# Patient Record
Sex: MALE | Race: WHITE | HISPANIC OR LATINO | URBAN - METROPOLITAN AREA
[De-identification: names, ages, dates, MRNs, and addresses within clinical notes are randomized per-mention and may not be internally consistent; named-entity substitution may affect disease eponyms.]

---

## 2019-02-09 ENCOUNTER — HOSPITAL ENCOUNTER (EMERGENCY)
Facility: OTHER | Age: 39
Discharge: HOME | End: 2019-02-09
Attending: EMERGENCY MEDICINE
Payer: COMMERCIAL

## 2019-02-09 VITALS
WEIGHT: 162 LBS | DIASTOLIC BLOOD PRESSURE: 58 MMHG | OXYGEN SATURATION: 99 % | RESPIRATION RATE: 18 BRPM | HEART RATE: 91 BPM | SYSTOLIC BLOOD PRESSURE: 115 MMHG | TEMPERATURE: 98 F

## 2019-02-09 DIAGNOSIS — J06.9 UPPER RESPIRATORY TRACT INFECTION, UNSPECIFIED TYPE: Primary | ICD-10-CM

## 2019-02-09 DIAGNOSIS — R09.81 NASAL CONGESTION: ICD-10-CM

## 2019-02-09 PROCEDURE — 99283 EMERGENCY DEPT VISIT LOW MDM: CPT

## 2019-02-09 PROCEDURE — 25000003 PHARM REV CODE 250: Performed by: EMERGENCY MEDICINE

## 2019-02-09 RX ADMIN — Medication 1 SPRAY: at 01:02

## 2019-02-09 NOTE — ED PROVIDER NOTES
Encounter Date: 2/9/2019    SCRIBE #1 NOTE: I, Alexx Mullins, am scribing for, and in the presence of, Dr. Davis .       History     Chief Complaint   Patient presents with    sinus congestion     sinus drainage with foul smell x 5 days. Cough somewhat relieved from tessalon pearls. Tested negative for flu twice over 2 week period.      Time seen by provider: 1:29 PM    This is a 38 y.o. male who presents with complaint of left-sided nasal congestion for the last week. He reports nasal discharge and foul smells. He has complied with fluticasone BID with no relief. Patient also reports intermittent fevers, intermittent chills, and dry cough the past five days. He has complied with Tessalon perles with improvement of cough. Patient notes he tested negative for the flu last week. He denies headaches, dizziness, sore throat, SOB, chest pain, abdominal pain, N/V/D, dysuria, urinary frequency, or urinary urgency. He denies significant past medical history, past surgical history, or use of daily prescription medications. He admits intermittent use of alcohol, but denies use of alcohol or illicit drugs.       The history is provided by the patient.     Review of patient's allergies indicates:  No Known Allergies  History reviewed. No pertinent past medical history.  History reviewed. No pertinent surgical history.  History reviewed. No pertinent family history.  Social History     Tobacco Use    Smoking status: Current Some Day Smoker   Substance Use Topics    Alcohol use: Yes    Drug use: Not on file     Review of Systems   Constitutional: Positive for chills and fever.   HENT: Positive for congestion (left naris) and rhinorrhea. Negative for sore throat.    Respiratory: Positive for cough. Negative for shortness of breath.    Cardiovascular: Negative for chest pain.   Gastrointestinal: Negative for abdominal pain, diarrhea, nausea and vomiting.   Genitourinary: Negative for dysuria, frequency and urgency.    Musculoskeletal: Negative for back pain.   Skin: Negative for rash.   Neurological: Negative for dizziness and headaches.   Psychiatric/Behavioral: Negative for confusion.       Physical Exam     Initial Vitals [02/09/19 1223]   BP Pulse Resp Temp SpO2   117/68 82 16 98.4 °F (36.9 °C) 100 %      MAP       --         Physical Exam    Nursing note and vitals reviewed.  Constitutional: He appears well-developed and well-nourished. No distress.   HENT:   Head: Normocephalic and atraumatic.   Swollen turbinates. Post nasal drip present. Cobblestoning of posterior oropharynx.    Eyes: Conjunctivae and EOM are normal. Pupils are equal, round, and reactive to light.   Neck: Normal range of motion. Neck supple.   Shotty anterior cervical lymphadenopathy.    Cardiovascular: Normal rate, regular rhythm, normal heart sounds and intact distal pulses.   Pulmonary/Chest: Breath sounds normal. No respiratory distress. He has no wheezes. He has no rhonchi. He has no rales.   Abdominal: Soft. There is no tenderness.   Musculoskeletal: Normal range of motion. He exhibits no edema.   Lymphadenopathy:     He has cervical adenopathy.   Neurological: He is alert and oriented to person, place, and time. He has normal strength. No cranial nerve deficit or sensory deficit.   Skin: Skin is warm and dry.   Psychiatric: He has a normal mood and affect. His behavior is normal. Judgment and thought content normal.         ED Course   Procedures  Labs Reviewed - No data to display       Imaging Results    None          Medical Decision Making:   ED Management:  Emergent evaluation of a 38-year-old male who presents with complaint of congestion and URI symptoms. Vital signs are benign, afebrile.  Physical exam reveals postnasal drip, swollen turbinates, cobblestoning.  There is no obvious source for any back symptoms is less than 2 weeks, I do not think antibiotics are indicated.  He is encouraged symptomatic treatment including Afrin b.i.d. x3  days, and other symptomatic care as needed.  He is encouraged to follow up with his PCP or to return for worsening.            Scribe Attestation:   Scribe #1: I performed the above scribed service and the documentation accurately describes the services I performed. I attest to the accuracy of the note.    Attending Attestation:           Physician Attestation for Scribe:  Physician Attestation Statement for Scribe #1: I, Dr. Davis, reviewed documentation, as scribed by Alexx Mullins  in my presence, and it is both accurate and complete.                    Clinical Impression:     1. Upper respiratory tract infection, unspecified type    2. Nasal congestion                                   Shameka Davis MD  02/09/19 1600